# Patient Record
Sex: FEMALE | Race: BLACK OR AFRICAN AMERICAN | NOT HISPANIC OR LATINO | Employment: FULL TIME | URBAN - METROPOLITAN AREA
[De-identification: names, ages, dates, MRNs, and addresses within clinical notes are randomized per-mention and may not be internally consistent; named-entity substitution may affect disease eponyms.]

---

## 2023-10-28 PROBLEM — R92.30 DENSE BREAST TISSUE: Status: ACTIVE | Noted: 2023-10-28

## 2023-10-28 PROBLEM — J01.90 ACUTE SINUSITIS: Status: RESOLVED | Noted: 2023-10-28 | Resolved: 2023-10-28

## 2023-10-28 PROBLEM — N89.8 VAGINAL DRYNESS: Status: RESOLVED | Noted: 2023-10-28 | Resolved: 2023-10-28

## 2023-10-28 PROBLEM — E16.2 HYPOGLYCEMIA: Status: ACTIVE | Noted: 2023-10-28

## 2023-10-28 PROBLEM — B35.1 ONYCHOMYCOSIS: Status: ACTIVE | Noted: 2023-10-28

## 2023-10-28 PROBLEM — H65.93 BILATERAL SEROUS OTITIS MEDIA: Status: RESOLVED | Noted: 2023-10-28 | Resolved: 2023-10-28

## 2023-10-28 PROBLEM — S99.929A TOE INJURY: Status: RESOLVED | Noted: 2023-10-28 | Resolved: 2023-10-28

## 2023-10-28 PROBLEM — M79.672 FOOT ARCH PAIN, LEFT: Status: RESOLVED | Noted: 2023-10-28 | Resolved: 2023-10-28

## 2023-10-28 PROBLEM — I10 HYPERTENSION: Status: ACTIVE | Noted: 2023-10-28

## 2023-10-28 PROBLEM — M25.511 RIGHT SHOULDER PAIN: Status: RESOLVED | Noted: 2023-10-28 | Resolved: 2023-10-28

## 2023-10-28 PROBLEM — R55 SYNCOPE AND COLLAPSE: Status: RESOLVED | Noted: 2023-10-28 | Resolved: 2023-10-28

## 2023-10-28 PROBLEM — R82.90 ABNORMAL FINDING IN URINE: Status: RESOLVED | Noted: 2023-10-28 | Resolved: 2023-10-28

## 2023-10-28 PROBLEM — J06.9 UPPER RESPIRATORY INFECTION: Status: RESOLVED | Noted: 2023-10-28 | Resolved: 2023-10-28

## 2023-10-28 PROBLEM — E55.9 VITAMIN D DEFICIENCY: Status: ACTIVE | Noted: 2023-10-28

## 2023-10-28 PROBLEM — J30.9 ALLERGIC RHINITIS: Status: ACTIVE | Noted: 2023-10-28

## 2023-10-28 PROBLEM — E11.9 DIABETES MELLITUS (MULTI): Status: ACTIVE | Noted: 2023-10-28

## 2023-10-28 PROBLEM — M75.81 ROTATOR CUFF TENDINITIS, RIGHT: Status: ACTIVE | Noted: 2023-10-28

## 2023-10-28 PROBLEM — B37.31 YEAST VAGINITIS: Status: RESOLVED | Noted: 2023-10-28 | Resolved: 2023-10-28

## 2023-10-28 PROBLEM — K58.9 IRRITABLE BOWEL SYNDROME: Status: ACTIVE | Noted: 2023-10-28

## 2023-10-28 PROBLEM — B02.9 SHINGLES: Status: RESOLVED | Noted: 2023-10-28 | Resolved: 2023-10-28

## 2023-10-28 PROBLEM — N76.0 VULVOVAGINITIS, ESTROGEN DEFICIENT: Status: ACTIVE | Noted: 2023-10-28

## 2023-10-28 PROBLEM — R92.8 ABNORMAL SCREENING MAMMOGRAM: Status: RESOLVED | Noted: 2023-10-28 | Resolved: 2023-10-28

## 2023-10-28 PROBLEM — M54.16 LUMBAR RADICULOPATHY: Status: ACTIVE | Noted: 2023-10-28

## 2023-10-28 PROBLEM — E78.5 DYSLIPIDEMIA: Status: ACTIVE | Noted: 2023-10-28

## 2023-10-28 PROBLEM — E11.9 DIABETES MELLITUS (MULTI): Status: RESOLVED | Noted: 2023-10-28 | Resolved: 2023-10-28

## 2023-10-28 PROBLEM — R92.1 BREAST CALCIFICATIONS ON MAMMOGRAM: Status: ACTIVE | Noted: 2023-10-28

## 2023-10-28 RX ORDER — NAPROXEN 500 MG/1
1 TABLET ORAL EVERY 12 HOURS PRN
COMMUNITY
Start: 2021-11-15

## 2023-10-28 RX ORDER — INSULIN LISPRO 100 [IU]/ML
INJECTION, SOLUTION INTRAVENOUS; SUBCUTANEOUS
COMMUNITY
Start: 2013-01-07

## 2023-10-28 RX ORDER — ROSUVASTATIN CALCIUM 20 MG/1
20 TABLET, COATED ORAL
COMMUNITY
Start: 2023-09-12 | End: 2024-04-26 | Stop reason: SDUPTHER

## 2023-10-28 RX ORDER — LISINOPRIL 20 MG/1
20 TABLET ORAL
COMMUNITY
Start: 2023-09-12

## 2023-10-28 RX ORDER — VALACYCLOVIR HYDROCHLORIDE 1 G/1
1 TABLET, FILM COATED ORAL 3 TIMES DAILY
COMMUNITY
Start: 2021-03-05

## 2023-10-28 RX ORDER — TIZANIDINE 2 MG/1
1 TABLET ORAL 2 TIMES DAILY PRN
COMMUNITY
Start: 2021-11-15

## 2023-11-20 ENCOUNTER — OFFICE VISIT (OUTPATIENT)
Dept: PRIMARY CARE | Facility: CLINIC | Age: 68
End: 2023-11-20
Payer: COMMERCIAL

## 2023-11-20 DIAGNOSIS — Z00.00 ROUTINE GENERAL MEDICAL EXAMINATION AT A HEALTH CARE FACILITY: Primary | ICD-10-CM

## 2023-11-20 LAB
ALBUMIN SERPL BCP-MCNC: 4.4 G/DL (ref 3.4–5)
ALP SERPL-CCNC: 101 U/L (ref 33–136)
ALT SERPL W P-5'-P-CCNC: 18 U/L (ref 7–45)
ANION GAP SERPL CALC-SCNC: 15 MMOL/L (ref 10–20)
AST SERPL W P-5'-P-CCNC: 18 U/L (ref 9–39)
BILIRUB SERPL-MCNC: 0.7 MG/DL (ref 0–1.2)
BUN SERPL-MCNC: 15 MG/DL (ref 6–23)
CALCIUM SERPL-MCNC: 10 MG/DL (ref 8.6–10.6)
CHLORIDE SERPL-SCNC: 107 MMOL/L (ref 98–107)
CO2 SERPL-SCNC: 25 MMOL/L (ref 21–32)
CREAT SERPL-MCNC: 0.84 MG/DL (ref 0.5–1.05)
ERYTHROCYTE [DISTWIDTH] IN BLOOD BY AUTOMATED COUNT: 13.8 % (ref 11.5–14.5)
GFR SERPL CREATININE-BSD FRML MDRD: 77 ML/MIN/1.73M*2
GLUCOSE SERPL-MCNC: 157 MG/DL (ref 74–99)
HCT VFR BLD AUTO: 41.8 % (ref 36–46)
HGB BLD-MCNC: 12.2 G/DL (ref 12–16)
MCH RBC QN AUTO: 29.8 PG (ref 26–34)
MCHC RBC AUTO-ENTMCNC: 29.2 G/DL (ref 32–36)
MCV RBC AUTO: 102 FL (ref 80–100)
NRBC BLD-RTO: 0 /100 WBCS (ref 0–0)
PLATELET # BLD AUTO: 341 X10*3/UL (ref 150–450)
POTASSIUM SERPL-SCNC: 4.4 MMOL/L (ref 3.5–5.3)
PROT SERPL-MCNC: 7.3 G/DL (ref 6.4–8.2)
RBC # BLD AUTO: 4.1 X10*6/UL (ref 4–5.2)
SODIUM SERPL-SCNC: 143 MMOL/L (ref 136–145)
TSH SERPL-ACNC: 2.06 MIU/L (ref 0.44–3.98)
VIT B12 SERPL-MCNC: 445 PG/ML (ref 211–911)
WBC # BLD AUTO: 5.8 X10*3/UL (ref 4.4–11.3)

## 2023-11-20 PROCEDURE — 3079F DIAST BP 80-89 MM HG: CPT | Performed by: FAMILY MEDICINE

## 2023-11-20 PROCEDURE — 4010F ACE/ARB THERAPY RXD/TAKEN: CPT | Performed by: FAMILY MEDICINE

## 2023-11-20 PROCEDURE — 1126F AMNT PAIN NOTED NONE PRSNT: CPT | Performed by: FAMILY MEDICINE

## 2023-11-20 PROCEDURE — 1036F TOBACCO NON-USER: CPT | Performed by: FAMILY MEDICINE

## 2023-11-20 PROCEDURE — 3075F SYST BP GE 130 - 139MM HG: CPT | Performed by: FAMILY MEDICINE

## 2023-11-20 PROCEDURE — 82607 VITAMIN B-12: CPT

## 2023-11-20 PROCEDURE — 85027 COMPLETE CBC AUTOMATED: CPT

## 2023-11-20 PROCEDURE — 36415 COLL VENOUS BLD VENIPUNCTURE: CPT

## 2023-11-20 PROCEDURE — 99397 PER PM REEVAL EST PAT 65+ YR: CPT | Performed by: FAMILY MEDICINE

## 2023-11-20 PROCEDURE — 1159F MED LIST DOCD IN RCRD: CPT | Performed by: FAMILY MEDICINE

## 2023-11-20 PROCEDURE — 84443 ASSAY THYROID STIM HORMONE: CPT

## 2023-11-20 PROCEDURE — 80053 COMPREHEN METABOLIC PANEL: CPT

## 2023-11-20 RX ORDER — PNEUMOCOCCAL 20-VALENT CONJUGATE VACCINE 2.2; 2.2; 2.2; 2.2; 2.2; 2.2; 2.2; 2.2; 2.2; 2.2; 2.2; 2.2; 2.2; 2.2; 2.2; 2.2; 4.4; 2.2; 2.2; 2.2 UG/.5ML; UG/.5ML; UG/.5ML; UG/.5ML; UG/.5ML; UG/.5ML; UG/.5ML; UG/.5ML; UG/.5ML; UG/.5ML; UG/.5ML; UG/.5ML; UG/.5ML; UG/.5ML; UG/.5ML; UG/.5ML; UG/.5ML; UG/.5ML; UG/.5ML; UG/.5ML
0.5 INJECTION, SUSPENSION INTRAMUSCULAR ONCE
Qty: 0.5 ML | Refills: 0 | Status: SHIPPED | OUTPATIENT
Start: 2023-11-20 | End: 2023-11-20

## 2023-11-20 ASSESSMENT — ENCOUNTER SYMPTOMS
OCCASIONAL FEELINGS OF UNSTEADINESS: 0
LOSS OF SENSATION IN FEET: 0
DEPRESSION: 0

## 2023-11-20 ASSESSMENT — PATIENT HEALTH QUESTIONNAIRE - PHQ9
SUM OF ALL RESPONSES TO PHQ9 QUESTIONS 1 AND 2: 0
1. LITTLE INTEREST OR PLEASURE IN DOING THINGS: NOT AT ALL
2. FEELING DOWN, DEPRESSED OR HOPELESS: NOT AT ALL

## 2023-11-20 NOTE — PROGRESS NOTES
"  Subjective     Patient ID: Tracey Price is a 67 y.o. female who presents for Annual Exam.      Last Physical :  1 Years ago     Pt's PMH, PSH, SH, FH , meds and allergies was obtained / reviewed and updated .     Dental visits : Y  Vision issues : N  Hearing issues : N    Immunizations : Y    Diet :  could be better  Exercise:  Weight concerns :     Alcohol: as noted in SH  Tobacco: as noted in SH  Recreational drug use : None/ as noted in SH    Sexually active : Active   Contraception :   Menstrual problems:  Premenopausal/perimenopausal/ postmenopausal:    G:  Parity:  Full term:    Premature:   (s):   Living :  Ab induced:   Ab spontaneous :  Ectopic :   Multiple :    PAP smear :  Mammogram :  Colonoscopy:    Metabolic screening   - Lipid   - Glucose  ======================================================    Visit Vitals  Blood Pressure 138/88   Height 1.702 m (5' 7\")   Weight 82.1 kg (181 lb)   Body Mass Index 28.35 kg/m²   Smoking Status Never   Body Surface Area 1.97 m²      No LMP recorded.     =====================================    Review of systems:  Constitutional: no chills, no fever and no night sweats.     Eyes: no blurred vision and no eyesight problems.     ENT: no hearing loss, no nasal congestion, no nasal discharge, no hoarseness and no sore throat.     Cardiovascular: no chest pain, no intermittent leg claudication, no lower extremity edema, no palpitations and no syncope.     Respiratory: no cough, no shortness of breath during exertion, no shortness of breath at rest and no wheezing.     Gastrointestinal: no abdominal pain, no blood in stools, no constipation, no diarrhea, no melena, no nausea, no rectal pain and no vomiting.     Genitourinary: no dysuria, no change in urinary frequency, no urinary hesitancy, no feelings of urinary urgency and no vaginal discharge.     Musculoskeletal: no arthralgias, no back pain and no myalgias.     Integumentary: no new skin lesions and no " rashes.     Neurological: no difficulty walking, no headache, no limb weakness, has had numbness tingling    Psychiatric: no anxiety, no depression, no anhedonia and no substance use disorders.     Endocrine: no recent weight gain and no recent weight loss.     Hematologic/Lymphatic: no tendency for easy bruising and no swollen glands.   ============================================================    Physical exam :    Constitutional: Alert and in no acute distress. Well developed, well nourished.     Eyes: Normal external exam. Pupils were equal in size, round, reactive to light (PERRL) with normal accommodation and extraocular movements intact (EOMI).     Ears, Nose, Mouth, and Throat: External inspection of ears and nose: Normal.  Otoscopic examination: Normal.      Neck: No neck mass was observed. Supple.     Cardiovascular: Heart rate and rhythm were normal, normal S1 and S2, no gallops, no murmurs and no pericardial rub    Pulmonary: No respiratory distress. Clear bilateral breath sounds.     Abdomen: Soft nontender; no abdominal mass palpated. No organomegaly.     Musculoskeletal: No joint swelling seen, normal movements of all extremities. Range of motion: Normal.  Muscle strength/tone: Normal.      Skin: Normal skin color and pigmentation, normal skin turgor, and no rash.     Neurologic: Nonfocal.     Psychiatric: Judgment and insight: Intact. Mood and affect: Normal.    Lymphatic : Cervical/ axillary/ groin Lns Palpable/ non palpable            All other systems have been reviewed and are negative for complaint.      Assessment/Plan    Problem List Items Addressed This Visit             ICD-10-CM    Routine general medical examination at a health care facility - Primary Z00.00    Relevant Orders    CBC (Completed)    Comprehensive Metabolic Panel (Completed)    TSH with reflex to Free T4 if abnormal (Completed)    Vitamin B12 (Completed)

## 2023-11-25 VITALS
WEIGHT: 181 LBS | SYSTOLIC BLOOD PRESSURE: 138 MMHG | HEIGHT: 67 IN | BODY MASS INDEX: 28.41 KG/M2 | DIASTOLIC BLOOD PRESSURE: 88 MMHG

## 2023-11-25 PROBLEM — Z00.00 ROUTINE GENERAL MEDICAL EXAMINATION AT A HEALTH CARE FACILITY: Status: ACTIVE | Noted: 2023-11-25

## 2024-04-26 DIAGNOSIS — E78.5 DYSLIPIDEMIA: Primary | ICD-10-CM

## 2024-04-26 RX ORDER — ROSUVASTATIN CALCIUM 20 MG/1
20 TABLET, COATED ORAL
Qty: 90 TABLET | Refills: 1 | Status: SHIPPED | OUTPATIENT
Start: 2024-04-26

## 2024-04-26 NOTE — TELEPHONE ENCOUNTER
Last OV 11/23    Requested Prescriptions     Pending Prescriptions Disp Refills    rosuvastatin (Crestor) 20 mg tablet 90 tablet 1     Sig: Take 1 tablet (20 mg) by mouth once daily.

## 2024-05-23 ENCOUNTER — OFFICE VISIT (OUTPATIENT)
Dept: OBSTETRICS AND GYNECOLOGY | Facility: CLINIC | Age: 69
End: 2024-05-23
Payer: COMMERCIAL

## 2024-05-23 VITALS
WEIGHT: 177 LBS | DIASTOLIC BLOOD PRESSURE: 70 MMHG | BODY MASS INDEX: 28.45 KG/M2 | HEIGHT: 66 IN | SYSTOLIC BLOOD PRESSURE: 130 MMHG

## 2024-05-23 DIAGNOSIS — Z80.3 FAMILY HISTORY OF BREAST CANCER IN FIRST DEGREE RELATIVE: ICD-10-CM

## 2024-05-23 DIAGNOSIS — Z01.419 ENCOUNTER FOR GYNECOLOGICAL EXAMINATION WITHOUT ABNORMAL FINDING: Primary | ICD-10-CM

## 2024-05-23 DIAGNOSIS — E55.9 VITAMIN D DEFICIENCY: ICD-10-CM

## 2024-05-23 DIAGNOSIS — Z78.0 MENOPAUSE: ICD-10-CM

## 2024-05-23 DIAGNOSIS — N95.8 GENITOURINARY SYNDROME OF MENOPAUSE: ICD-10-CM

## 2024-05-23 PROCEDURE — 99397 PER PM REEVAL EST PAT 65+ YR: CPT | Performed by: OBSTETRICS & GYNECOLOGY

## 2024-05-23 PROCEDURE — 3078F DIAST BP <80 MM HG: CPT | Performed by: OBSTETRICS & GYNECOLOGY

## 2024-05-23 PROCEDURE — 3075F SYST BP GE 130 - 139MM HG: CPT | Performed by: OBSTETRICS & GYNECOLOGY

## 2024-05-23 PROCEDURE — 4010F ACE/ARB THERAPY RXD/TAKEN: CPT | Performed by: OBSTETRICS & GYNECOLOGY

## 2024-05-23 PROCEDURE — 1159F MED LIST DOCD IN RCRD: CPT | Performed by: OBSTETRICS & GYNECOLOGY

## 2024-05-23 RX ORDER — ESTRADIOL 0.1 MG/G
0.5 CREAM VAGINAL NIGHTLY
Qty: 42.5 G | Refills: 3 | Status: SHIPPED | OUTPATIENT
Start: 2024-05-23 | End: 2025-05-23

## 2024-05-23 NOTE — PROGRESS NOTES
Subjective   Tracey Price is a 68 y.o. female here for a routine exam. She has no postmenopausal bleeding or discharge.  No change in bowel habits or dysuria.  She is current on her colonoscopy.    We discussed that she plans to retire in 2024.    She does have a history of breast cancer in her mother and maternal grandmother. Personal health questionnaire reviewed: yes.     Gynecologic History  No LMP recorded. Patient is postmenopausal.  Contraception: post menopausal status  Last Pap: 23. Results were: normal  Last mammogram: 23. Results were: normal    Obstetric History  OB History    Para Term  AB Living   0 0 0 0 0 0   SAB IAB Ectopic Multiple Live Births   0 0 0 0 0       Objective   Constitutional: Alert and in no acute distress. Well developed, well nourished.   Head and Face: Head and face: Normal.    Eyes: Normal external exam - nonicteric sclera, extraocular movements intact (EOMI) and no ptosis.   Neck: No neck asymmetry. Supple. Thyroid not enlarged and there were no palpable thyroid nodules.    Pulmonary: No respiratory distress.   Chest: Breasts: Normal appearance, no nipple discharge and no skin changes. Palpation of breasts and axillae: No palpable mass and no axillary lymphadenopathy.   Abdomen: Soft nontender; no abdominal mass palpated. No organomegaly. No hernias.   Genitourinary: External genitalia: Normal. No inguinal lymphadenopathy. Bartholin's Urethral and Skenes Glands: Normal. Urethra: Normal.  Bladder: Normal on palpation. Vagina: Normal. Cervix: Normal.  Uterus: Normal.  Right Adnexa/parametria: Normal.  Left Adnexa/parametria: Normal.  Inspection of Perianal Area: Normal.   Musculoskeletal: No joint swelling seen, normal movements of all extremities.   Skin: Normal skin color and pigmentation, normal skin turgor, and no rash.   Neurologic: Non-focal. Grossly intact.   Psychiatric: Alert and oriented x 3. Affect normal to patient baseline. Mood:  Appropriate.  Physical Exam     Assessment/Plan   Healthy female exam.  This is a 68-year-old female with a normal exam.  No Pap was sent, she is high risk HPV negative.    Her routine mammogram was ordered with tomosynthesis.    We discussed obtaining a bone density test to monitor for osteoporosis.    We did discuss estradiol cream refill to manage the vaginal dryness of menopause.    She has family history of breast cancer and a genetic consultation was placed.    I will see her routinely in 1 year.  Mammogram ordered.

## 2024-07-01 ENCOUNTER — HOSPITAL ENCOUNTER (OUTPATIENT)
Dept: RADIOLOGY | Facility: CLINIC | Age: 69
Discharge: HOME | End: 2024-07-01
Payer: MEDICARE

## 2024-07-01 VITALS — WEIGHT: 174 LBS | BODY MASS INDEX: 28.08 KG/M2

## 2024-07-01 DIAGNOSIS — Z01.419 ENCOUNTER FOR GYNECOLOGICAL EXAMINATION WITHOUT ABNORMAL FINDING: ICD-10-CM

## 2024-07-01 PROCEDURE — 77063 BREAST TOMOSYNTHESIS BI: CPT | Performed by: RADIOLOGY

## 2024-07-01 PROCEDURE — 77067 SCR MAMMO BI INCL CAD: CPT

## 2024-07-01 PROCEDURE — 77067 SCR MAMMO BI INCL CAD: CPT | Performed by: RADIOLOGY

## 2024-09-18 DIAGNOSIS — E78.5 DYSLIPIDEMIA: ICD-10-CM

## 2024-09-18 RX ORDER — ROSUVASTATIN CALCIUM 20 MG/1
20 TABLET, COATED ORAL DAILY
Qty: 90 TABLET | Refills: 1 | Status: SHIPPED | OUTPATIENT
Start: 2024-09-18

## 2024-09-28 NOTE — PROGRESS NOTES
History of Present Illness:  Tracey Price is a 68 y.o. female with a family of breast cancer.  Tracey Price was referred to the Cancer Genetics Clinic at [facility] by Tanya Noble. Tracey Price is interested in genetic testing to clarify her personal risk for cancer, as well as the risks to her family members.    Cancer Medical History:  Personal history of cancer? No    Prior hereditary cancer (germline) genetic testing? No    Cancer screening history:  Mammograms? Yes, most recent 24. Negative. Right breast biopsy on 17 showed calcified fibroadenoma.  PAP smear? Negative pap smear 23.  Colonoscopy? Negative cologuard in 23. Has not done a colonoscopy yet.  Upper endoscopy? Yes, patient reports this was done many years ago (in the s) due to acid reflux. No records available.  Dermatology? No   Other cancer screening? No    Reproductive History:  Number of children: 0  Number of pregnancies: 0  Age first birth: N/A  Breast feeding?  N/A  Menarche (age): 17  Menopause (age): 50  OCP: Yes, for ~5 years total  HRT: Yes - When first diagnosed with menopause, used for 10 years.    Hysterectomy? No  Oophorectomy? No    Family history:  A 4-generation pedigree was obtained and was significant for the following:     - Mother (85) who was diagnosed with unilateral breast cancer in her late 60's.  - Maternal uncle  in his 70's due to an unknown cancer  - Maternal grandmother with breast cancer diagnosed in her 40's. In her 50's, she was diagnosed with breast cancer in the other breast. She  in her 70's due to Alzheimer's disease.    - Father diagnosed with lymphoma in his 60's. No history of other immune or medical concerns prior to developing the cancer. Smoking history. He  at 69 due to Parkinson's disease. The cancer was reportedly gone by the time of his death.    - Paternal aunt  diagnosed with stomach cancer in her late 60's. She  in her mid 70's due to  the cancer. Her son (patient's paternal cousin)  in his 40's/50's due to an unknown cancer.  - Paternal uncle  in his 30's due to throat cancer. Smoking history.  - Paternal cousin diagnosed with stomach cancer in her early 50's, and she  2 years after her diagnosis due to the cancer.    Maternal ancestry is .  Paternal ancestry is . There is no known Ashkenazi [employer]od[employer] ancestry. Consanguinity was denied.     Genetic counseling:    Summary  Ms. Price is a 68 y.o. female with a family history of breast cancer.     Tracey Price's reported history is concerning for possible hereditary breast cancer. Ms. Price meets current national (NCCN) criteria for testing of high-penetrance breast cancer susceptibility genes, including BRCA1, BRCA2, CDH1, PALB2, PTEN, STK11, and TP53.      Testing is medically necessary, as it will help determine if Ms. Price is a candidate for high-risk breast care, including more frequent screenings and consideration of a risk reducing bilateral mastectomy    Tracey is interested in testing, however testing was not ordered today. We discussed the limitations in testing an unaffected individual. Tracey will talk with her mother, who has a history of breast cancer, to see if she is interested in testing first. Tracey will call me back and we will discuss if she would like to proceed with testing.    Discussion    We reviewed genes and chromosomes, inherited forms of breast cancer.  We discussed that most cancers are not due to an inherited genetic susceptibility.  However, in about 5-10% of families, there is an inherited genetic mutation that can make a person more susceptible to developing certain forms of cancer.  Within families with a genetic predisposition to cancer, we often see certain patterns, such as multiple family members with cancer and cancers occurring in multiple generations. In addition, earlier onset and/or bilateral cancers are  suggestive of an inherited predisposition. There also tends to be a clustering of certain types of related cancer in these families, such as breast and ovarian cancers, or colon and uterine cancers.     We discussed the BRCA1 and BRCA2 genes, which are two genes that have been linked to early-onset breast and/or ovarian cancer, causing HBOC (hereditary breast and ovarian cancer syndrome).  Mutations in these genes are inherited in a dominant pattern and confer up to an 87% lifetime risk for breast cancer.  This is elevated compared to the general population risk of 10-12%.  In addition, BRCA1 and BRCA2 mutation carriers have up to a 45% lifetime risk for ovarian cancer, which is elevated over the 2% general population risk.  Mutation carriers who have already been diagnosed with cancer have an increased risk to develop a second, contralateral breast cancer.  BRCA2 gene mutation carriers have an increased risk for male breast cancer, prostate cancer, melanoma, gastric cancer, and pancreatic cancer.    Gene mutations in BRCA1 and BRCA2 are inherited in an autosomal dominant fashion. This means that if an individual has a change in either of these genes, their siblings and children have a 50% chance of also having that gene change and a 50% chance of not having the gene change.     We discussed that there are multiple genes associated with increased breast cancer risk.  Some genes, like the BRCA1 and BRCA2 genes, are considered highly penetrant breast cancer genes, meaning a mutation in the gene confers a high risk of breast cancer.  Additionally, there are other intermediate (moderate risk) breast cancer genes. For some of the moderate risk genes, there is often limited information regarding the degree to which a mutation in the gene impacts ones risk for different types of cancers.  Additionally, for some of these moderate risk genes, the appropriate management for individuals who have a mutation in one of these  genes is not always clear.  Our knowledge about the cancer risks associated with mutations in these moderate risk genes is always growing, and we will likely be able to provide more comprehensive information in the future. In many cases, even if an individual tests positive for a mutation in a moderate risk gene, recommendations are still based on the family history, not the positive test result. Additionally, unexpected findings may occur, where a mutation is identified that confers a risk for a cancer not seen in the family history.  Lastly, in the era of multigene panel testing, we know that more than one pathogenic or likely pathogenic variant can be identified in any one individual and/or family    We also discussed her paternal family history of cancer. Ms. Price reported a paternal aunt with stomach cancer in her late 60's, and a paternal cousin with stomach cancer diagnosed in her early 50's.  Ms. Price did not know the specific type of stomach cancer. We discussed that sometimes stomach cancer can be a part of a hereditary cancer syndrome called Hereditary Diffuse Gastric Cancer, caused by mutations in the CDH1 gene.  We discussed that Ms. Price does not meet NCCN criteria for genetic testing for this syndrome based on her paternal family history. She was encouraged to update us if she learns additional information about her paternal history, as this may change our risk assessment. If she chooses to pursue testing in the future, however, testing can include this gene.     We reviewed the types of results we can get back:   - A positive result means that we identified a change in a gene that confers an increased cancer risk for cancer. We will discuss potential changes in management for her and her family based on the specific gene mutation found.    - A negative (normal) result clears her for a majority of cancer predisposition syndromes that we are aware of, but cannot clear her for any/all cancer  predisposition risks. She would continue to be screened based on her personal and family history.    - A Variant of Uncertain Significance (VUS) means that some kind of change was found in a gene, but it is currently unknown whether this specific change is harmful.  These results do not  recommendations or warrant familial variant testing.    We reviewed that it is best, if possible, to test a cancer-affected individual when trying to determine the cause of the cancer in the family.  We discussed that if this is not feasible, for any reason, then it is very appropriate to test cancer-unaffected family members.  We discussed that in Ms. Price's family, the most informative relative for testing would be her mother, who has a history of breast cancer.  If testing for her mother is not feasible at this time, we discussed that testing for Ms. Price would certainly be appropriate.    We reviewed the limitations of testing an unaffected individual for a hereditary cancer syndrome. When testing an individual who has not had a cancer diagnosis, like Ms. Price, a negative test result may have limited utility in defining future cancer risks, as it is unclear whether the affected family members had a mutation that the unaffected individual did not inherit, or alternatively, whether the family history of cancer is due to unidentifiable risk factors that are shared between the unaffected individual and their family members. Ms. Price will talk with her mother, who has a history of breast cancer, to see if she is interested in testing first. Ms. Price will call me back and we will discuss if she would like to proceed with testing. Ms. Price gave me verbal permission to look in her chart if her mother calls me to discuss testing.    We discussed that if her mother proceeds with testing and is negative, then testing for Ms. Price would not be indicated based on her maternal family history of cancer. If her  mother were to test positive, Ms. Price and her brother would be at a 50% chance to inherit the gene mutation. Of note, she does not currently meet NCCN guidelines for genetic testing based on her paternal history of cancer.    Although Ms. Price meets NCCN criteria for genetic testing, she does not meet Medicare testing criteria. Ms. Price reported that Medicare is her primary insurance. Medicare only covers genetic testing for individuals who are affected with certain cancer types.  Ms. Price was offered testing via the self-pay option of $249. Ultimately, Ms. Price decided she wanted to talk with her mother first before deciding whether or not to proceed with testing for herself.    Since testing was not ordered today, we performed a [facility] risk assessment for Ms. Price to estimate her lifetime risk of developing breast cancer. Results showed that her lifetime risk for breast cancer is 29.3%. We discussed that this risk assessment may change if Tracey or her mother decide to proceed with testing. For women with a lifetime risk of breast cancer estimated to be >20%, The National Comprehensive Cancer Network (NCCN) guidelines include:   Breast awareness,   Clinical breast examination performed by a physician every 6-12 month,  Annual mammogram to begin 10 years prior to the youngest breast cancer diagnosis in the family, but not less than age 30, or begin at age 40, and  Annual breast MRI beginning 10 years prior to the youngest breast cancer diagnosis in the family, but not less than age 25, or begin at 40.    Recommendations and options for management should be discussed with managing physicians.  A referral was placed for Tracey to be seen in the high risk breast cancer clinic given her elevated lifetime risk to develop breast cancer.    Tracey Price was counseled about hereditary cancer susceptibility including cancer risks, options for increased screening and/or risk reduction, genetic testing,  and the implications for other family members.  After a discussion about the risks, benefits, and limitations of genetic testing, Tracey Price chose to not proceed with testing today. Ms. Price will ask her mother if she would be open to pursuing genetic testing first, and will follow up with me to discuss next steps.      PLAN:  Tracey will talk with her mother, who has a history of breast cancer, to see if she is interested in testing first. Tracey will follow up with me to discuss if she would like to proceed with testing.  A referral was placed for Tracey to be seen in the high risk breast cancer clinic given her elevated lifetime risk to develop breast cancer.  We remain available to Ms. Price at [phone] if any questions arise regarding information discussed at today's visit.    Georgina Jewell MS, GC  Licensed Genetic Counselor  [facility]  Phone: [phone]     Reviewed by:  Edna Mendez MD  Clinical   [facility]    Time spent with patient: 65 minutes  An virtual (video and audio) between the patient (at the originating site) and provider (at the distant site) was utilized to provide this telehealth service. Verbal consent was requested and obtained from Tracey Price on this date, October 2, 2024 for a telehealth visit.  The patient stated they were located in [place] at the time of visit.

## 2024-10-02 ENCOUNTER — APPOINTMENT (OUTPATIENT)
Dept: GENETICS | Facility: CLINIC | Age: 69
End: 2024-10-02
Payer: COMMERCIAL

## 2024-10-02 DIAGNOSIS — Z80.7 FAMILY HISTORY OF LYMPHOMA: ICD-10-CM

## 2024-10-02 DIAGNOSIS — Z80.0 FAMILY HISTORY OF STOMACH CANCER: ICD-10-CM

## 2024-10-02 DIAGNOSIS — Z80.3 FAMILY HISTORY OF BREAST CANCER IN FIRST DEGREE RELATIVE: ICD-10-CM

## 2024-10-02 DIAGNOSIS — Z71.83 ENCOUNTER FOR NONPROCREATIVE GENETIC COUNSELING: Primary | ICD-10-CM

## 2024-10-02 DIAGNOSIS — Z80.0 FAMILY HISTORY OF THROAT CANCER: ICD-10-CM

## 2024-10-02 PROCEDURE — GENMD PR GENETICS VISIT (MEDICAID/MEDICARE)

## 2024-10-04 ENCOUNTER — TELEPHONE (OUTPATIENT)
Dept: GENETICS | Facility: CLINIC | Age: 69
End: 2024-10-04
Payer: COMMERCIAL

## 2024-10-04 NOTE — TELEPHONE ENCOUNTER
I spoke with Ms. Price on 10/4/24 regarding genetic testing. Her mother is open to pursuing genetic testing. Ms. Price was given the phone number for our front office to have her mother scheduled for a genetic counseling session.    We discussed the importance of testing a cancer-affected individual first, as it may impact Ms. Price risk assessment and need for increased screening.     Since Ms. Price has a estimated lifetime risk of breast cancer that is >20%, she was referred to the high risk breast clinic. She is scheduled to see them on November 19th.   We discussed that her risk assessment may change based off of her moms genetic test results.

## 2024-10-17 ENCOUNTER — TELEPHONE (OUTPATIENT)
Dept: GENETICS | Facility: CLINIC | Age: 69
End: 2024-10-17
Payer: COMMERCIAL

## 2024-10-17 NOTE — TELEPHONE ENCOUNTER
Called patient to confirm that her mother was scheduled for a genetics visit. We discussed that an updated risk assessment will be done for Tracey after her mothers visit and potential testing.

## 2024-10-22 DIAGNOSIS — E78.5 DYSLIPIDEMIA: ICD-10-CM

## 2024-10-22 RX ORDER — ROSUVASTATIN CALCIUM 20 MG/1
20 TABLET, COATED ORAL DAILY
Qty: 90 TABLET | Refills: 1 | OUTPATIENT
Start: 2024-10-22

## 2024-10-22 RX ORDER — ROSUVASTATIN CALCIUM 20 MG/1
20 TABLET, COATED ORAL DAILY
Qty: 30 TABLET | Refills: 0 | Status: SHIPPED | OUTPATIENT
Start: 2024-10-22 | End: 2024-10-24

## 2024-10-24 DIAGNOSIS — E78.5 DYSLIPIDEMIA: ICD-10-CM

## 2024-10-24 RX ORDER — ROSUVASTATIN CALCIUM 20 MG/1
20 TABLET, COATED ORAL DAILY
Qty: 90 TABLET | Refills: 0 | Status: SHIPPED | OUTPATIENT
Start: 2024-10-24

## 2024-10-28 DIAGNOSIS — E78.5 DYSLIPIDEMIA: ICD-10-CM

## 2024-10-28 RX ORDER — ROSUVASTATIN CALCIUM 20 MG/1
20 TABLET, COATED ORAL DAILY
Qty: 90 TABLET | Refills: 0 | Status: SHIPPED | OUTPATIENT
Start: 2024-10-28

## 2024-11-17 NOTE — PROGRESS NOTES
Chief Complaint  New patient, high risk evaluation     History Of Present Illness  Tracey Price is a very pleasant 68 y.o. AA  female presenting with for a high risk breast cancer evaluation.  2017 benign right breast core biopsy yielded fibroadenoma. She denies breast  surgery. She has family history breast cancer in mother and maternal grandmother.      BREAST IMAGIN2024 Bilateral screening mammogram, BI-RADS Category 2. No breast MRIs performed.      REPRODUCTIVE HISTORY: menarche age 16, nulliparous, OCP's > 20 years,  natural menopause age 50, heterogeneously dense breast tissue          FAMILY CANCER HISTORY:   Mother: Breast cancer 65  Maternal grandmother: Breast cancer age  45    Review of Systems  Constitutional:  Negative for appetite change, fatigue, fever and unexpected weight change.   HENT:  Negative for ear pain, hearing loss, nosebleeds, sore throat and trouble swallowing.    Eyes:  Negative for discharge, itching and visual disturbance.   Breast: As stated in HPI.  Respiratory:  Negative for cough, chest tightness and shortness of breath.    Cardiovascular:  Negative for chest pain, palpitations and leg swelling.   Gastrointestinal:  Negative for abdominal pain, constipation, diarrhea and nausea.   Endocrine: Negative for cold intolerance and heat intolerance.   Genitourinary:  Negative for dysuria, frequency, hematuria, pelvic pain and vaginal bleeding.   Musculoskeletal:  Negative for arthralgias, back pain, gait problem, joint swelling and myalgias.   Skin:  Negative for color change and rash.   Allergic/Immunologic: Negative for environmental allergies and food allergies.   Neurological:  Negative for dizziness, tremors, speech difficulty, weakness, numbness and headaches.   Hematological:  Does not bruise/bleed easily.   Psychiatric/Behavioral:  Negative for agitation, dysphoric mood and sleep disturbance. The patient is not nervous/anxious.       Surgical History  She has a past  surgical history that includes Breast biopsy (Right).     Social History  She reports that she has never smoked. She has never used smokeless tobacco. She reports that she does not currently use alcohol. She reports that she does not use drugs.    Family History  Family History   Problem Relation Name Age of Onset    Breast cancer Mother      Lymphoma Father      Breast cancer Maternal Grandmother          Allergies  Patient has no known allergies.    Past Medical History  She has a past medical history of Acute sinusitis (10/28/2023), Bilateral serous otitis media (10/28/2023), Foot arch pain, left (10/28/2023), Right shoulder pain (10/28/2023), Shingles (10/28/2023), Syncope and collapse (10/28/2023), Type 2 diabetes mellitus without complications (Multi) (11/18/2022), Vaginal dryness (10/28/2023), and Yeast vaginitis (10/28/2023).     Physical Exam  Patient is alert and oriented x3 and in a relaxed and appropriate mood. Her gait is steady and hand grasps are equal. Sclera is clear. The breasts are nearly symmetrical. The tissue is soft without palpable abnormalities, discrete nodules or masses. The skin and nipples appear normal. There is no cervical, supraclavicular or axillary lymphadenopathy. Heart rate and rhythm normal, S1 and S2 appreciated. The lungs are clear to auscultation bilaterally. Abdomen is soft and non-tender.      Last Recorded Vitals  Blood pressure 148/72, pulse 89, temperature 36.3 °C (97.4 °F), temperature source Temporal, weight 80.3 kg (177 lb), not currently breastfeeding.    Relevant Results and Imaging  BI mammo bilateral screening tomosynthesis 07/01/2024    Narrative  Interpreted By:  Deepali Frost,  STUDY:  BI MAMMO BILATERAL SCREENING TOMOSYNTHESIS;  7/1/2024 7:45 am    ACCESSION NUMBER(S):  [id number]    ORDERING CLINICIAN:  ELIZABETH MEEK    INDICATION:  Screening. Benign right breast biopsy. Family history of breast  cancer.    COMPARISON:  05/23/2023 and  01/13/2022.    FINDINGS:  2D and tomosynthesis images were reviewed at 1 mm slice thickness.    Density:  The breast tissue is heterogeneously dense, which may  obscure small masses.    Bilateral benign calcifications are present. A biopsy tissue marker  is noted in the slightly medial inferior right breast at posterior  depth. No suspicious masses or calcifications are identified.    Impression  No mammographic evidence of malignancy.    BI-RADS CATEGORY:  BI-RADS Category:  2 Benign.  Recommendation:  Annual Screening.  Recommended Date:  1 Year.  Laterality:  Bilateral.        For any future breast imaging appointments, please call [phone] ([phone]).      MACRO:  None    Signed by: Deepali Frost 7/3/2024 5:15 PM  Dictation workstation:   [id number]        Provider Impressions  Normal clinical breast exam and imaging, history benign breast biopsy and no surgery, family history breast cancer. Hesitant to start endocrine therapy- discussed breast MRI surveillance.    Risk Profile      Patient Discussion/Summary  Your clinical examination and imaging are normal. Please return in July for mammogram and office visit or sooner if you have any problems or concerns.     High risk breast surveillance care plan:  Yearly mammogram with digital breast tomosynthesis  Twice yearly clinical breast examinations  Breast MRI-due January   Monthly self breast examinations &/or regular self breast awareness  Vitamin D3 2000 IU/daily (over the counter) unless your PCP recommends you take a specific dose  Exercise 3-4 times per week for 45-60 minutes  Limit alcohol to 3-4 drinks per week if you are menopausal  Eat healthy low-fat diet with lots of vegetable & fruits    Risk model indicates you are eligible for endocrine therapy with Tamoxifen, Raloxifene or Aromatase inhibitor. Endocrine therapy reduces lifetime risk of breast cancer by up to 50% when taken for 5 years. There is an alternative low dose Tamoxifen which can be taken  for only 3 years.      IMPORTANT INFORMATION REGARDING YOUR RESULTS    If you receive medical information from [facility] Personal Health Record (online chart) your results will be released into your chart. This means you may view or see results of your biopsy or procedure before I contact you directly. If this occurs, please call the office and we will discuss your results over the phone.     You can see your health information, review clinical summaries from office visits & test results online when you follow your health with MY  Chart, a personal health record. To sign up go to [email or web address]. If you need assistance with signing up or trouble getting into your account call [employer] Patient Line 24/7 at [phone].    My office phone number is [phone] if you need to get in touch with me or have additional questions or concerns. Thank you for choosing [facility] and trusting me as your healthcare provider. I look forward to seeing you again at your next office visit. I am honored to be a provider on your health care team and I remain dedicated to helping you achieve your health goals.    Crissy Jacques, APRN-CNP

## 2024-11-19 ENCOUNTER — OFFICE VISIT (OUTPATIENT)
Dept: SURGICAL ONCOLOGY | Facility: CLINIC | Age: 69
End: 2024-11-19
Payer: MEDICARE

## 2024-11-19 VITALS
TEMPERATURE: 97.4 F | SYSTOLIC BLOOD PRESSURE: 148 MMHG | BODY MASS INDEX: 28.57 KG/M2 | HEART RATE: 89 BPM | DIASTOLIC BLOOD PRESSURE: 72 MMHG | WEIGHT: 177 LBS

## 2024-11-19 DIAGNOSIS — Z12.39 BREAST CANCER SCREENING, HIGH RISK PATIENT: Primary | ICD-10-CM

## 2024-11-19 DIAGNOSIS — Z80.3 FAMILY HISTORY OF BREAST CANCER IN FIRST DEGREE RELATIVE: ICD-10-CM

## 2024-11-19 DIAGNOSIS — Z12.31 ENCOUNTER FOR SCREENING MAMMOGRAM FOR MALIGNANT NEOPLASM OF BREAST: ICD-10-CM

## 2024-11-19 PROCEDURE — 1160F RVW MEDS BY RX/DR IN RCRD: CPT

## 2024-11-19 PROCEDURE — 1159F MED LIST DOCD IN RCRD: CPT

## 2024-11-19 PROCEDURE — 99204 OFFICE O/P NEW MOD 45 MIN: CPT

## 2024-11-19 PROCEDURE — 3077F SYST BP >= 140 MM HG: CPT

## 2024-11-19 PROCEDURE — 3078F DIAST BP <80 MM HG: CPT

## 2024-11-19 PROCEDURE — 4010F ACE/ARB THERAPY RXD/TAKEN: CPT

## 2024-11-19 PROCEDURE — 1036F TOBACCO NON-USER: CPT

## 2024-11-19 PROCEDURE — 1126F AMNT PAIN NOTED NONE PRSNT: CPT

## 2024-11-19 PROCEDURE — 99214 OFFICE O/P EST MOD 30 MIN: CPT

## 2024-11-19 ASSESSMENT — PAIN SCALES - GENERAL: PAINLEVEL_OUTOF10: 0-NO PAIN

## 2024-11-19 NOTE — PATIENT INSTRUCTIONS
Your clinical examination and imaging are normal. Please return in July for mammogram and office visit or sooner if you have any problems or concerns.     High risk breast surveillance care plan:  Yearly mammogram with digital breast tomosynthesis  Twice yearly clinical breast examinations  Breast MRI-due January   Monthly self breast examinations &/or regular self breast awareness  Vitamin D3 2000 IU/daily (over the counter) unless your PCP recommends you take a specific dose  Exercise 3-4 times per week for 45-60 minutes  Limit alcohol to 3-4 drinks per week if you are menopausal  Eat healthy low-fat diet with lots of vegetable & fruits    Risk model indicates you are eligible for endocrine therapy with Tamoxifen, Raloxifene or Aromatase inhibitor. Endocrine therapy reduces lifetime risk of breast cancer by up to 50% when taken for 5 years. There is an alternative low dose Tamoxifen which can be taken for only 3 years.      IMPORTANT INFORMATION REGARDING YOUR RESULTS    If you receive medical information from My [facility] Personal Health Record (online chart) your results will be released into your chart. This means you may view or see results of your biopsy or procedure before I contact you directly. If this occurs, please call the office and we will discuss your results over the phone.     You can see your health information, review clinical summaries from office visits & test results online when you follow your health with MY  Chart, a personal health record. To sign up go to [email or web address]. If you need assistance with signing up or trouble getting into your account call [employer] Patient Line 24/7 at [phone].    My office phone number is [phone] if you need to get in touch with me or have additional questions or concerns. Thank you for choosing [facility] and trusting me as your healthcare provider. I look forward to seeing you again at your next office visit. I am honored to be  a provider on your health care team and I remain dedicated to helping you achieve your health goals.

## 2024-11-22 ENCOUNTER — APPOINTMENT (OUTPATIENT)
Dept: PRIMARY CARE | Facility: CLINIC | Age: 69
End: 2024-11-22
Payer: COMMERCIAL

## 2024-11-22 DIAGNOSIS — E10.9 TYPE 1 DIABETES MELLITUS WITHOUT COMPLICATION: ICD-10-CM

## 2024-11-22 DIAGNOSIS — E78.5 DYSLIPIDEMIA: ICD-10-CM

## 2024-11-22 DIAGNOSIS — Z00.00 ROUTINE GENERAL MEDICAL EXAMINATION AT A HEALTH CARE FACILITY: Primary | ICD-10-CM

## 2024-11-22 DIAGNOSIS — E55.9 VITAMIN D DEFICIENCY: ICD-10-CM

## 2024-11-22 DIAGNOSIS — R94.120 ABNORMAL AUDITORY FUNCTION STUDY: ICD-10-CM

## 2024-11-22 DIAGNOSIS — Z78.0 POSTMENOPAUSAL: ICD-10-CM

## 2024-11-22 DIAGNOSIS — I10 PRIMARY HYPERTENSION: ICD-10-CM

## 2024-11-22 DIAGNOSIS — Z00.00 ROUTINE GENERAL MEDICAL EXAMINATION AT HEALTH CARE FACILITY: ICD-10-CM

## 2024-11-22 PROCEDURE — 82570 ASSAY OF URINE CREATININE: CPT

## 2024-11-22 PROCEDURE — 82043 UR ALBUMIN QUANTITATIVE: CPT

## 2024-11-22 ASSESSMENT — ACTIVITIES OF DAILY LIVING (ADL)
TAKING_MEDICATION: INDEPENDENT
GROCERY_SHOPPING: INDEPENDENT
DOING_HOUSEWORK: INDEPENDENT
MANAGING_FINANCES: INDEPENDENT
BATHING: INDEPENDENT
DRESSING: INDEPENDENT

## 2024-11-22 ASSESSMENT — ENCOUNTER SYMPTOMS
LOSS OF SENSATION IN FEET: 0
DEPRESSION: 0
OCCASIONAL FEELINGS OF UNSTEADINESS: 0

## 2024-11-22 ASSESSMENT — VISUAL ACUITY
OS_CC: 20/20
OD_CC: 20/15

## 2024-11-22 NOTE — PROGRESS NOTES
"Subjective   Reason for Visit: Tracey Price is an 68 y.o. female here for a Medicare Wellness visit.     Past Medical, Surgical, and Family History reviewed and updated in chart.         HPI  Patient is here for follow-up of diabetes hypertension hyperlipidemia states that she was given ways to increase her lisinopril to 40 mg by her endocrinologist but became extremely dizzy and therefore went back to 20 mg blood pressure is elevated today  Patient Self Assessment of Health Status  Patient Self Assessment: Good    Nutrition and Exercise  Current Diet: Diabetic Diet  Adequate Fluid Intake: Yes  Caffeine: Yes  Exercise Frequency: No Exercise    Functional Ability/Level of Safety  Cognitive Impairment Observed: No cognitive impairment observed  Cognitive Impairment Reported: No cognitive impairment reported by patient or family    Home Safety Risk Factors: None    Patient Care Team:  Anderson Okeefe MD as PCP - General     Review of Systems   Constitutional:  Negative for chills and fever.   HENT: Negative.     Respiratory: Negative.     Cardiovascular: Negative.    Gastrointestinal: Negative.  Negative for nausea and vomiting.   Endocrine: Negative.    Genitourinary: Negative.    Musculoskeletal: Negative.    Skin: Negative.  Negative for rash.   Allergic/Immunologic: Negative.    Neurological: Negative.    Hematological: Negative.    Psychiatric/Behavioral: Negative.     All other systems reviewed and are negative.      Objective   Vitals:  /78   Pulse 70   Ht 1.676 m (5' 6\")   Wt 80.9 kg (178 lb 6.4 oz)   LMP  (LMP Unknown)   BMI 28.79 kg/m²       Physical Exam  Constitutional:       Appearance: Normal appearance.   Cardiovascular:      Rate and Rhythm: Normal rate and regular rhythm.   Pulmonary:      Effort: Pulmonary effort is normal.      Breath sounds: Normal breath sounds.   Abdominal:      General: Bowel sounds are normal.   Neurological:      General: No focal deficit present.      Mental " Status: She is alert.   Psychiatric:         Mood and Affect: Mood normal.         Assessment/Plan   Problem List Items Addressed This Visit       Dyslipidemia    Current Assessment & Plan     Check lipid panel continue medications continue healthy eating work out  150 minutes a week patient will continue low-fat diet increase exercise to 5 times daily         Hypertension    Current Assessment & Plan     What is High Blood Pressure? High blood pressure (also called hypertension) is when your blood moves through your arteries at a higher pressure than normal and that forces your heart to work harder to pump the blood.     What are the Complications of High Blood Pressure? When your blood pressure gets too high or stays high for a long time, it can cause health problems such as:    Kidney disease or kidney failure   Heart attack and heart failure   Stroke   Vision problems   Circulation problems, poor blood supply to the legs    How are the causes of High Blood Pressure? There are many different causes and your provider can help you find out what might be causing your pressure to be too high.     What are the Signs of High Blood Pressure? High blood pressure doesn’t usually have warning signs or symptoms, so many people don’t realize they have it and that is why regular monitoring is important.     Prevention and Treatment: Often high blood pressure can be controlled with lifestyle changes and when necessary, medications. Adopting heart healthy habits can help:      Maintain a Healthy Weight   Eat a heart healthy diet full of vegetables, fruits and whole grains that are high in fiber   Be Physically Active every day   Find heart healthy ways to reduce stress and increase relaxation    Don’t use tobacco products   Limit your intake of alcohol, caffeine and salt   Monitor your blood pressure regularly: ask your provider how often   Take your medications as prescribed, even when you’re feeling well   Patient will  "increase lisinopril to 20 mg twice daily         Relevant Medications    lisinopril 20 mg tablet    Other Relevant Orders    EKG 12-LEAD    Vitamin D deficiency    Relevant Orders    Vitamin B12    Vitamin D 25-Hydroxy,Total (for eval of Vitamin D levels)    DM type 1 (diabetes mellitus, type 1) (Multi)    Current Assessment & Plan     Follow-up with endocrinology [facility] is using NovoLog pump         Relevant Orders    Vitamin B12    Albumin-Creatinine Ratio, Urine Random (Completed)    Routine general medical examination at a health care facility    Relevant Orders    Lipid Panel    CBC (Completed)    TSH with reflex to Free T4 if abnormal    Comprehensive Metabolic Panel    Abnormal auditory function study    Relevant Orders    CBC (Completed)    Postmenopausal - Primary    Relevant Orders    Vitamin B12    Vitamin D 25-Hydroxy,Total (for eval of Vitamin D levels)    XR DEXA bone density      spent 15 minutes obtaining and discussing depression screening using PHQ-2 questions with results documented in chart.”  (If screen positive: \"The screen indicated potential depression and PHQ-9 was obtained with treatment and referral plan discussed         "

## 2024-11-23 ENCOUNTER — LAB (OUTPATIENT)
Dept: LAB | Facility: LAB | Age: 69
End: 2024-11-23
Payer: MEDICARE

## 2024-11-23 VITALS
BODY MASS INDEX: 28.67 KG/M2 | SYSTOLIC BLOOD PRESSURE: 134 MMHG | HEART RATE: 70 BPM | WEIGHT: 178.4 LBS | DIASTOLIC BLOOD PRESSURE: 78 MMHG | HEIGHT: 66 IN

## 2024-11-23 DIAGNOSIS — E10.9 TYPE 1 DIABETES MELLITUS WITHOUT COMPLICATION: ICD-10-CM

## 2024-11-23 DIAGNOSIS — E55.9 VITAMIN D DEFICIENCY: ICD-10-CM

## 2024-11-23 DIAGNOSIS — R94.120 ABNORMAL AUDITORY FUNCTION STUDY: ICD-10-CM

## 2024-11-23 DIAGNOSIS — Z00.00 ROUTINE GENERAL MEDICAL EXAMINATION AT A HEALTH CARE FACILITY: ICD-10-CM

## 2024-11-23 DIAGNOSIS — Z78.0 POSTMENOPAUSAL: ICD-10-CM

## 2024-11-23 LAB
25(OH)D3 SERPL-MCNC: 40 NG/ML (ref 30–100)
ALBUMIN SERPL BCP-MCNC: 4.2 G/DL (ref 3.4–5)
ALP SERPL-CCNC: 84 U/L (ref 33–136)
ALT SERPL W P-5'-P-CCNC: 16 U/L (ref 7–45)
ANION GAP SERPL CALCULATED.3IONS-SCNC: 11 MMOL/L (ref 10–20)
AST SERPL W P-5'-P-CCNC: 17 U/L (ref 9–39)
BILIRUB SERPL-MCNC: 0.9 MG/DL (ref 0–1.2)
BUN SERPL-MCNC: 15 MG/DL (ref 6–23)
CALCIUM SERPL-MCNC: 9.7 MG/DL (ref 8.6–10.3)
CHLORIDE SERPL-SCNC: 108 MMOL/L (ref 98–107)
CHOLEST SERPL-MCNC: 98 MG/DL (ref 0–199)
CHOLEST/HDLC SERPL: 2.5 {RATIO}
CO2 SERPL-SCNC: 27 MMOL/L (ref 21–32)
CREAT SERPL-MCNC: 0.81 MG/DL (ref 0.5–1.05)
CREAT UR-MCNC: 142.1 MG/DL (ref 20–320)
EGFRCR SERPLBLD CKD-EPI 2021: 80 ML/MIN/1.73M*2
ERYTHROCYTE [DISTWIDTH] IN BLOOD BY AUTOMATED COUNT: 13.4 % (ref 11.5–14.5)
GLUCOSE SERPL-MCNC: 110 MG/DL (ref 74–99)
HCT VFR BLD AUTO: 38.9 % (ref 36–46)
HDLC SERPL-MCNC: 39.7 MG/DL
HGB BLD-MCNC: 12.3 G/DL (ref 12–16)
LDLC SERPL CALC-MCNC: 47 MG/DL
MCH RBC QN AUTO: 28.7 PG (ref 26–34)
MCHC RBC AUTO-ENTMCNC: 31.6 G/DL (ref 32–36)
MCV RBC AUTO: 91 FL (ref 80–100)
MICROALBUMIN UR-MCNC: <7 MG/L
MICROALBUMIN/CREAT UR: NORMAL MG/G{CREAT}
NON HDL CHOLESTEROL: 58 MG/DL (ref 0–149)
NRBC BLD-RTO: 0 /100 WBCS (ref 0–0)
PLATELET # BLD AUTO: 308 X10*3/UL (ref 150–450)
POTASSIUM SERPL-SCNC: 4.1 MMOL/L (ref 3.5–5.3)
PROT SERPL-MCNC: 7.3 G/DL (ref 6.4–8.2)
RBC # BLD AUTO: 4.28 X10*6/UL (ref 4–5.2)
SODIUM SERPL-SCNC: 142 MMOL/L (ref 136–145)
TRIGL SERPL-MCNC: 58 MG/DL (ref 0–149)
TSH SERPL-ACNC: 1.86 MIU/L (ref 0.44–3.98)
VIT B12 SERPL-MCNC: 553 PG/ML (ref 211–911)
VLDL: 12 MG/DL (ref 0–40)
WBC # BLD AUTO: 6 X10*3/UL (ref 4.4–11.3)

## 2024-11-23 PROCEDURE — 82607 VITAMIN B-12: CPT

## 2024-11-23 PROCEDURE — 82306 VITAMIN D 25 HYDROXY: CPT

## 2024-11-23 PROCEDURE — 36415 COLL VENOUS BLD VENIPUNCTURE: CPT

## 2024-11-23 RX ORDER — LISINOPRIL 20 MG/1
20 TABLET ORAL 2 TIMES DAILY
Qty: 180 TABLET | Refills: 1 | Status: SHIPPED | OUTPATIENT
Start: 2024-11-23 | End: 2025-11-23

## 2024-11-23 ASSESSMENT — ENCOUNTER SYMPTOMS
PSYCHIATRIC NEGATIVE: 1
FEVER: 0
GASTROINTESTINAL NEGATIVE: 1
NEUROLOGICAL NEGATIVE: 1
CARDIOVASCULAR NEGATIVE: 1
MUSCULOSKELETAL NEGATIVE: 1
VOMITING: 0
ENDOCRINE NEGATIVE: 1
ALLERGIC/IMMUNOLOGIC NEGATIVE: 1
CHILLS: 0
NAUSEA: 0
RESPIRATORY NEGATIVE: 1
HEMATOLOGIC/LYMPHATIC NEGATIVE: 1

## 2024-11-23 NOTE — ASSESSMENT & PLAN NOTE
What is High Blood Pressure? High blood pressure (also called hypertension) is when your blood moves through your arteries at a higher pressure than normal and that forces your heart to work harder to pump the blood.     What are the Complications of High Blood Pressure? When your blood pressure gets too high or stays high for a long time, it can cause health problems such as:    Kidney disease or kidney failure   Heart attack and heart failure   Stroke   Vision problems   Circulation problems, poor blood supply to the legs    How are the causes of High Blood Pressure? There are many different causes and your provider can help you find out what might be causing your pressure to be too high.     What are the Signs of High Blood Pressure? High blood pressure doesn’t usually have warning signs or symptoms, so many people don’t realize they have it and that is why regular monitoring is important.     Prevention and Treatment: Often high blood pressure can be controlled with lifestyle changes and when necessary, medications. Adopting heart healthy habits can help:      Maintain a Healthy Weight   Eat a heart healthy diet full of vegetables, fruits and whole grains that are high in fiber   Be Physically Active every day   Find heart healthy ways to reduce stress and increase relaxation    Don’t use tobacco products   Limit your intake of alcohol, caffeine and salt   Monitor your blood pressure regularly: ask your provider how often   Take your medications as prescribed, even when you’re feeling well   Patient will increase lisinopril to 20 mg twice daily

## 2024-11-23 NOTE — ASSESSMENT & PLAN NOTE
Check lipid panel continue medications continue healthy eating work out  150 minutes a week patient will continue low-fat diet increase exercise to 5 times daily

## 2024-11-23 NOTE — ASSESSMENT & PLAN NOTE
Follow-up with endocrinology [facility] is using Symcircle pump   15-Julius-2019

## 2024-12-02 ENCOUNTER — TELEPHONE (OUTPATIENT)
Dept: GENETICS | Facility: CLINIC | Age: 69
End: 2024-12-02
Payer: MEDICARE

## 2024-12-02 NOTE — TELEPHONE ENCOUNTER
Ms. Price contacted the genetics  asking about next steps for her mothers genetic testing. We discussed that we will return to her personal risk assessment when we get her mothers results , as this will tell us if testing is indicated or not. Ms. Price will plan to accompany her mother during her results visit.    Ms. Price also asked where her mother could take her sample kit. I instructed her to take the kit to any  lab for the blood draw.

## 2024-12-09 DIAGNOSIS — E78.5 DYSLIPIDEMIA: ICD-10-CM

## 2024-12-09 RX ORDER — ROSUVASTATIN CALCIUM 20 MG/1
20 TABLET, COATED ORAL DAILY
Qty: 90 TABLET | Refills: 1 | Status: SHIPPED | OUTPATIENT
Start: 2024-12-09

## 2024-12-23 ENCOUNTER — TELEPHONE (OUTPATIENT)
Dept: GENETICS | Facility: CLINIC | Age: 69
End: 2024-12-23
Payer: MEDICARE

## 2024-12-23 NOTE — TELEPHONE ENCOUNTER
Summary:   Tracey Price is a 68 y.o. female with a family of breast cancer.  Tracey Price was referred to the Cancer Genetics Clinic at [facility] by Tanya Noble. Tracey Price was previously seen in genetics on 10/2/24 to discuss genetic testing. In our initial visit, we discussed that the most informative individual person for testing would be her mother, who had a personal history of breast cancer. Ms. Ferros spoke to her mother about testing, and her mother subsequently underwent comprehensive genetic testing. Her mothers testing has returned and was entirely negative. Tracey was present during the results discussion for her mother, and we discussed the implications for Tracey.      Family History: As previously Reported  A 4-generation pedigree was obtained and was significant for the following:      - Mother (85) who was diagnosed with unilateral breast cancer in her late 60's.  - Maternal uncle  in his 70's due to an unknown cancer  - Maternal grandmother with breast cancer diagnosed in her 40's. In her 50's, she was diagnosed with breast cancer in the other breast. She  in her 70's due to Alzheimer's disease.     - Father diagnosed with lymphoma in his 60's. No history of other immune or medical concerns prior to developing the cancer. Smoking history. He  at 69 due to Parkinson's disease. The cancer was reportedly gone by the time of his death.     - Paternal aunt  diagnosed with stomach cancer in her late 60's. She  in her mid 70's due to the cancer. Her son (patient's paternal cousin)  in his 40's/50's due to an unknown cancer.  - Paternal uncle  in his 30's due to throat cancer. Smoking history.  - Paternal cousin diagnosed with stomach cancer in her early 50's, and she  2 years after her diagnosis due to the cancer. In our most recent discussion, Ms. Price stated she thinks her cousin was diagnosed around 52 years old.     Maternal ancestry is  .  Paternal ancestry is . There is no known [employer] ancestry. Consanguinity was denied.     Genetic Counseling Discussion    In the absence of an identifiable genetic risk factor, Ms. Ferros cancer risks are shaped by the family history of cancer.  Breast cancer risk: Given that Ms. Burkett's mother had negative comprehensive testing, genetic testing is not indicated for Tracey based on her maternal family history. However, given the breast cancer in the family, a referral was placed for Tracey to be seen in the high risk breast clinic. Tracey was seen by Crissy Jacques on 11/19/24. Ms. Price stated that she has a breast MRI upcoming in January. We discussed that she should continue with the recommendations of her care provider based on the family history of breast cancer.  Other cancer risks:   We discussed the other cancers in the family. Per Tracey, there are no updates to the family history that was initially collected on 10/2/24 (listed above). We discussed that Tracey's paternal history is less suspicious for a hereditary predisposition to cancer. Given the paternal family history, Ms. Price does not meet national (NCCN) criteria for genetic testing.  We discussed that genetic testing can still be done on a self-pay basis (for $250). Ms. Price declined the option for self-pay testing at this time.  Ms. Price may have an increased risks for other cancers based on the family history, such as stomach cancer and lymphoma. Any cancer surveillance planning for Ms. Price's family members should involve a discussion with their physicians and an assessment of their personal risk factors. We encouraged Ms. Price to discuss her family history of cancer with her PCP.     PLAN:  Ms. Price mother had negative comprehensive genetic testing. Ms. Price declined the option for self-pay testing based on her paternal family history of cancer.   Our understanding of genetic  contribution to cancer diagnoses is always evolving, so there may be additional testing recommended in the future. Ms. Price was asked to keep us informed of any changes to their personal and/or family history of cancer, and to contact us in ~5 years to determine if there have been any changes since our discussion today.   We remain available to Ms. Price at [phone] if any questions arise regarding information discussed at today's visit.    Georgina Jewell MS, GC  Licensed Genetic Counselor  [place] [facility]  Phone: [phone]

## 2025-01-06 ENCOUNTER — HOSPITAL ENCOUNTER (OUTPATIENT)
Dept: RADIOLOGY | Facility: HOSPITAL | Age: 70
Discharge: HOME | End: 2025-01-06

## 2025-01-06 DIAGNOSIS — Z80.3 FAMILY HISTORY OF BREAST CANCER IN FIRST DEGREE RELATIVE: ICD-10-CM

## 2025-01-06 DIAGNOSIS — Z12.31 ENCOUNTER FOR SCREENING MAMMOGRAM FOR MALIGNANT NEOPLASM OF BREAST: ICD-10-CM

## 2025-01-06 DIAGNOSIS — Z12.39 BREAST CANCER SCREENING, HIGH RISK PATIENT: ICD-10-CM

## 2025-01-06 PROCEDURE — 6100000003 BI MR BREAST BILATERAL WITH CONTRAST FAST SCREENING SELF PAY

## 2025-01-06 PROCEDURE — A9575 INJ GADOTERATE MEGLUMI 0.1ML: HCPCS

## 2025-01-06 PROCEDURE — 2550000001 HC RX 255 CONTRASTS

## 2025-01-06 RX ORDER — GADOTERATE MEGLUMINE 376.9 MG/ML
16 INJECTION INTRAVENOUS
Status: COMPLETED | OUTPATIENT
Start: 2025-01-06 | End: 2025-01-06

## 2025-01-06 RX ADMIN — GADOTERATE MEGLUMINE 16 ML: 376.9 INJECTION INTRAVENOUS at 08:57

## 2025-01-07 ENCOUNTER — TELEPHONE (OUTPATIENT)
Dept: SURGERY | Facility: HOSPITAL | Age: 70
End: 2025-01-07
Payer: MEDICARE

## 2025-01-07 NOTE — TELEPHONE ENCOUNTER
Result Communication    Resulted Orders   MR breast bilateral w IV contrast fast screening self pay    Narrative    Interpreted By:  Emanuel Lowery,   STUDY:  BI MR BREAST BILATERAL WITH CONTRAST FAST SCREENING SELF PAY;  1/6/2025 9:03 am      ACCESSION NUMBER(S):  [id number]      ORDERING CLINICIAN:  IRINA VALENTE      INDICATION:  Dense breast tissue on mammography. Patient has a family history of  breast cancer. History of benign right breast biopsy.      ,Z80.3 Family history of malignant neoplasm of breast,Z12.39  Encounter for other screening for malignant neoplasm of breast,Z12.31  Encounter for screening mammogram for malignant neoplasm of breast      COMPARISON:  Mammograms dated 07/01/2024, 05/23/2023      TECHNIQUE:  Using a dedicated breast coil, STIR axial and T1-weighted fat  saturation axial images of the breasts were obtained, the latter both  before and after intravenous administration of Gadolinium DTPA.      Intravenous contrast: 16 ML of Dotarem      FINDINGS:  Density: Heterogeneous fibroglandular tissue.      There is symmetric minimal bilateral background enhancement.      RIGHT BREAST:  No suspicious mass or nonmass enhancement is  identified.      No axillary or internal mammary lymphadenopathy is appreciated.      LEFT BREAST:  No suspicious mass or nonmass enhancement is identified.      No axillary or internal mammary lymphadenopathy is appreciated.      NON-BREAST FINDINGS:  None.        Impression    No MRI evidence of malignancy in either breast.      BI-RADS CATEGORY:      BI-RADS Category:  1 Negative.  Recommendation:  Annual Screening.  Recommended Date:  1 Year.  Laterality:  Bilateral.      For any future breast imaging appointments, please call [phone] ([phone]).          MACRO:  None      Signed by: Emanuel Lowery 1/7/2025 8:40 AM  Dictation workstation:   [id number]       2:45 PM      Results were successfully communicated with the patient and they acknowledged their  understanding.

## 2025-05-23 ENCOUNTER — APPOINTMENT (OUTPATIENT)
Dept: OBSTETRICS AND GYNECOLOGY | Facility: CLINIC | Age: 70
End: 2025-05-23
Payer: COMMERCIAL

## 2025-05-27 DIAGNOSIS — E78.5 DYSLIPIDEMIA: ICD-10-CM

## 2025-05-27 RX ORDER — ROSUVASTATIN CALCIUM 20 MG/1
20 TABLET, COATED ORAL DAILY
Qty: 90 TABLET | Refills: 1 | Status: SHIPPED | OUTPATIENT
Start: 2025-05-27

## 2025-05-29 ENCOUNTER — APPOINTMENT (OUTPATIENT)
Facility: CLINIC | Age: 70
End: 2025-05-29
Payer: MEDICARE

## 2025-05-29 VITALS — WEIGHT: 176 LBS | BODY MASS INDEX: 28.28 KG/M2 | HEIGHT: 66 IN

## 2025-05-29 DIAGNOSIS — N95.8 GENITOURINARY SYNDROME OF MENOPAUSE: ICD-10-CM

## 2025-05-29 DIAGNOSIS — Z01.419 ENCOUNTER FOR GYNECOLOGICAL EXAMINATION WITHOUT ABNORMAL FINDING: Primary | ICD-10-CM

## 2025-05-29 DIAGNOSIS — R92.30 DENSE BREAST TISSUE: ICD-10-CM

## 2025-05-29 DIAGNOSIS — N76.0 VULVOVAGINITIS, ESTROGEN DEFICIENT: ICD-10-CM

## 2025-05-29 RX ORDER — ESTRADIOL 0.1 MG/G
0.5 CREAM VAGINAL NIGHTLY
Qty: 42.5 G | Refills: 3 | Status: SHIPPED | OUTPATIENT
Start: 2025-05-29 | End: 2026-05-29

## 2025-05-29 ASSESSMENT — ENCOUNTER SYMPTOMS
OCCASIONAL FEELINGS OF UNSTEADINESS: 0
DEPRESSION: 0
LOSS OF SENSATION IN FEET: 0

## 2025-05-29 ASSESSMENT — COLUMBIA-SUICIDE SEVERITY RATING SCALE - C-SSRS
6. HAVE YOU EVER DONE ANYTHING, STARTED TO DO ANYTHING, OR PREPARED TO DO ANYTHING TO END YOUR LIFE?: NO
1. IN THE PAST MONTH, HAVE YOU WISHED YOU WERE DEAD OR WISHED YOU COULD GO TO SLEEP AND NOT WAKE UP?: NO
2. HAVE YOU ACTUALLY HAD ANY THOUGHTS OF KILLING YOURSELF?: NO

## 2025-05-29 ASSESSMENT — PATIENT HEALTH QUESTIONNAIRE - PHQ9
1. LITTLE INTEREST OR PLEASURE IN DOING THINGS: NOT AT ALL
SUM OF ALL RESPONSES TO PHQ9 QUESTIONS 1 AND 2: 0
2. FEELING DOWN, DEPRESSED OR HOPELESS: NOT AT ALL

## 2025-05-29 NOTE — PROGRESS NOTES
Subjective   Tracey Price is a 69 y.o. female here for GYN care.  She has no postmenopausal bleeding or discharge.  No dysuria or change in bowel habits.  Her Cologuard in May 2023 was negative.    She is followed by the breast specialist due to family history of breast cancer in her mother and maternal grandmother.  Her mammogram in 2024 and  fast MRI in 2025 are normal.    Personal health questionnaire reviewed: yes.     Gynecologic History  No LMP recorded (lmp unknown). Patient is postmenopausal.  Contraception: post menopausal status  Last Pap: 23. Results were: normal  Last mammogram: 24. Results were: normal    Obstetric History  OB History    Para Term  AB Living   0 0 0 0 0 0   SAB IAB Ectopic Multiple Live Births   0 0 0 0 0       Objective   Constitutional: Alert and in no acute distress. Well developed, well nourished.   Head and Face: Head and face: Normal.    Eyes: Normal external exam - nonicteric sclera, extraocular movements intact (EOMI) and no ptosis.   Neck: No neck asymmetry. Supple. Thyroid not enlarged and there were no palpable thyroid nodules.    Pulmonary: No respiratory distress.   Chest: Breasts: Normal appearance, no nipple discharge and no skin changes. Palpation of breasts and axillae: No palpable mass and no axillary lymphadenopathy.   Abdomen: Soft nontender; no abdominal mass palpated. No organomegaly. No hernias.   Genitourinary: External genitalia: Normal. No inguinal lymphadenopathy. Bartholin's Urethral and Skenes Glands: Normal. Urethra: Normal.  Bladder: Normal on palpation. Vagina: Normal. Cervix: Normal.  Uterus: Normal.  Right Adnexa/parametria: Normal.  Left Adnexa/parametria: Normal.  Inspection of Perianal Area: Normal.   Musculoskeletal: No joint swelling seen, normal movements of all extremities.   Skin: Normal skin color and pigmentation, normal skin turgor, and no rash.   Neurologic: Non-focal. Grossly intact.   Psychiatric:  Alert and oriented x 3. Affect normal to patient baseline. Mood: Appropriate.  Physical Exam     Assessment/Plan   This is a 69-year-old female with a normal exam.  No Pap smear was sent, she is high risk HPV negative in 2023.   I recommend continuing the estradiol cream for the genitourinary syndrome of menopause.  She will continue using a pea-sized amount in the vagina 3 times weekly.  This was refilled to her pharmacy.  She is current on her breast imaging with the breast specialist due to family history.    We discussed follow-up in 1 year.    Education reviewed: self breast exams.

## 2025-06-26 NOTE — PROGRESS NOTES
Chief Complaint  High risk evaluation     History Of Present Illness  Tracey Price is a very pleasant 69 y.o. AA  female presenting with for a high risk breast cancer surveillance.  2017 benign right breast core biopsy yielded fibroadenoma. She denies breast surgery. Declined endocrine therapy. She has family history breast cancer in mother and maternal grandmother.      BREAST IMAGIN2025 Bilateral screening mammogram, BI-RADS Category pending. 2025 FAST breast MRI BI-RADS Category 1.       REPRODUCTIVE HISTORY: menarche age 16, nulliparous, OCP's > 20 years,  natural menopause age 50, heterogeneously dense breast tissue          FAMILY CANCER HISTORY:   Mother: Breast cancer age 65  Maternal grandmother: Breast cancer age  45    Review of Systems  Constitutional:  Negative for appetite change, fatigue, fever and unexpected weight change.   HENT:  Negative for ear pain, hearing loss, nosebleeds, sore throat and trouble swallowing.    Eyes:  Negative for discharge, itching and visual disturbance.   Breast: As stated in HPI.  Respiratory:  Negative for cough, chest tightness and shortness of breath.    Cardiovascular:  Negative for chest pain, palpitations and leg swelling.   Gastrointestinal:  Negative for abdominal pain, constipation, diarrhea and nausea.   Endocrine: Negative for cold intolerance and heat intolerance.   Genitourinary:  Negative for dysuria, frequency, hematuria, pelvic pain and vaginal bleeding.   Musculoskeletal:  Negative for arthralgias, back pain, gait problem, joint swelling and myalgias.   Skin:  Negative for color change and rash.   Allergic/Immunologic: Negative for environmental allergies and food allergies.   Neurological:  Negative for dizziness, tremors, speech difficulty, weakness, numbness and headaches.   Hematological:  Does not bruise/bleed easily.   Psychiatric/Behavioral:  Negative for agitation, dysphoric mood and sleep disturbance. The patient is not  nervous/anxious.       Surgical History  She has a past surgical history that includes Breast biopsy (Right, [id number]).     Social History  She reports that she has never smoked. She has never used smokeless tobacco. She reports that she does not currently use alcohol. She reports that she does not use drugs.    Family History  Family History   Problem Relation Name Age of Onset    Breast cancer Mother Bonnie Hernandez     Lymphoma Father Benito Hernandez     Breast cancer Maternal Grandmother Isabella Cummings         Allergies  Patient has no known allergies.    Past Medical History  She has a past medical history of Acute sinusitis (10/28/2023), Bilateral serous otitis media (10/28/2023), Foot arch pain, left (10/28/2023), Right shoulder pain (10/28/2023), Shingles (10/28/2023), Syncope and collapse (10/28/2023), Type 2 diabetes mellitus without complications (11/18/2022), Vaginal dryness (10/28/2023), and Yeast vaginitis (10/28/2023).    She has no past medical history of Personal history of irradiation.     Physical Exam  Patient is alert and oriented x3 and in a relaxed and appropriate mood. Her gait is steady and hand grasps are equal. Sclera is clear. The breasts are nearly symmetrical. The tissue is soft without palpable abnormalities, discrete nodules or masses. The skin and nipples appear normal. There is no cervical, supraclavicular or axillary lymphadenopathy. Heart rate and rhythm normal, S1 and S2 appreciated. The lungs are clear to auscultation bilaterally. Abdomen is soft and non-tender.      Last Recorded Vitals  Blood pressure 133/71, pulse 62, temperature 36.4 °C (97.6 °F), temperature source Temporal, weight 80.6 kg (177 lb 12.8 oz), SpO2 96%, not currently breastfeeding.    Relevant Results and Imaging    Interpreted By:  Emanuel Lowery,   STUDY:  BI MR BREAST BILATERAL WITH CONTRAST FAST SCREENING SELF PAY;  1/6/2025 9:03 am      ACCESSION NUMBER(S):  [id number]      ORDERING CLINICIAN:  IRINA VALENTE       INDICATION:  Dense breast tissue on mammography. Patient has a family history of  breast cancer. History of benign right breast biopsy.      ,Z80.3 Family history of malignant neoplasm of breast,Z12.39  Encounter for other screening for malignant neoplasm of breast,Z12.31  Encounter for screening mammogram for malignant neoplasm of breast      COMPARISON:  Mammograms dated 07/01/2024, 05/23/2023      TECHNIQUE:  Using a dedicated breast coil, STIR axial and T1-weighted fat  saturation axial images of the breasts were obtained, the latter both  before and after intravenous administration of Gadolinium DTPA.      Intravenous contrast: 16 ML of Dotarem      FINDINGS:  Density: Heterogeneous fibroglandular tissue.      There is symmetric minimal bilateral background enhancement.      RIGHT BREAST:  No suspicious mass or nonmass enhancement is  identified.      No axillary or internal mammary lymphadenopathy is appreciated.      LEFT BREAST:  No suspicious mass or nonmass enhancement is identified.      No axillary or internal mammary lymphadenopathy is appreciated.      NON-BREAST FINDINGS:  None.      IMPRESSION:  No MRI evidence of malignancy in either breast.      BI-RADS CATEGORY:      BI-RADS Category:  1 Negative.  Recommendation:  Annual Screening.  Recommended Date:  1 Year.  Laterality:  Bilateral.      Provider Impressions  Normal clinical breast exam and imaging, history benign breast biopsy and no surgery, family history breast cancer. Declined endocrine therapy    Risk Profile      Patient Discussion/Summary  Your clinical examination is normal. Your bilateral screening mammogram is pending. I will call you with the results if abnormal.  Please return in July 2026 for mammogram and office visit or sooner if you have any problems or concerns.     High risk breast surveillance care plan:  Yearly mammogram with digital breast tomosynthesis  Twice yearly clinical breast examinations  Breast MRI-due January  2026  Monthly self breast examinations &/or regular self breast awareness  Vitamin D3 2000 IU/daily (over the counter) unless your PCP recommends you take a specific dose  Exercise 3-4 times per week for 45-60 minutes  Limit alcohol to 3-4 drinks per week if you are menopausal  Eat healthy low-fat diet with lots of vegetable & fruits    Risk model indicates you are eligible for endocrine therapy with Tamoxifen, Raloxifene or Aromatase inhibitor. Endocrine therapy reduces lifetime risk of breast cancer by up to 50% when taken for 5 years. There is an alternative low dose Tamoxifen which can be taken for only 3 years.      IMPORTANT INFORMATION REGARDING YOUR RESULTS    If you receive medical information from My [facility] Personal Health Record (online chart) your results will be released into your chart. This means you may view or see results of your biopsy or procedure before I contact you directly. If this occurs, please call the office and we will discuss your results over the phone.     You can see your health information, review clinical summaries from office visits & test results online when you follow your health with MY  Chart, a personal health record. To sign up go to [email or web address]. If you need assistance with signing up or trouble getting into your account call [facility] Patient Line 24/7 at [phone].    My office phone number is [phone] if you need to get in touch with me or have additional questions or concerns. Thank you for choosing [facility] and trusting me as your healthcare provider. I look forward to seeing you again at your next office visit. I am honored to be a provider on your health care team and I remain dedicated to helping you achieve your health goals.    Crissy Jacques, APRN-CNP

## 2025-07-02 ENCOUNTER — OFFICE VISIT (OUTPATIENT)
Dept: SURGICAL ONCOLOGY | Facility: CLINIC | Age: 70
End: 2025-07-02
Payer: MEDICARE

## 2025-07-02 ENCOUNTER — HOSPITAL ENCOUNTER (OUTPATIENT)
Dept: RADIOLOGY | Facility: CLINIC | Age: 70
Discharge: HOME | End: 2025-07-02
Payer: MEDICARE

## 2025-07-02 VITALS
HEART RATE: 62 BPM | OXYGEN SATURATION: 96 % | SYSTOLIC BLOOD PRESSURE: 133 MMHG | TEMPERATURE: 97.6 F | WEIGHT: 177.8 LBS | DIASTOLIC BLOOD PRESSURE: 71 MMHG | BODY MASS INDEX: 28.7 KG/M2

## 2025-07-02 DIAGNOSIS — Z80.3 FAMILY HISTORY OF BREAST CANCER IN FIRST DEGREE RELATIVE: ICD-10-CM

## 2025-07-02 DIAGNOSIS — Z12.39 BREAST CANCER SCREENING, HIGH RISK PATIENT: ICD-10-CM

## 2025-07-02 DIAGNOSIS — Z12.31 ENCOUNTER FOR SCREENING MAMMOGRAM FOR MALIGNANT NEOPLASM OF BREAST: ICD-10-CM

## 2025-07-02 DIAGNOSIS — Z12.31 SCREENING MAMMOGRAM FOR BREAST CANCER: Primary | ICD-10-CM

## 2025-07-02 DIAGNOSIS — R92.333 HETEROGENEOUSLY DENSE TISSUE OF BOTH BREASTS ON MAMMOGRAPHY: ICD-10-CM

## 2025-07-02 PROCEDURE — 77063 BREAST TOMOSYNTHESIS BI: CPT | Performed by: STUDENT IN AN ORGANIZED HEALTH CARE EDUCATION/TRAINING PROGRAM

## 2025-07-02 PROCEDURE — 4010F ACE/ARB THERAPY RXD/TAKEN: CPT

## 2025-07-02 PROCEDURE — 3075F SYST BP GE 130 - 139MM HG: CPT

## 2025-07-02 PROCEDURE — 3078F DIAST BP <80 MM HG: CPT

## 2025-07-02 PROCEDURE — 1126F AMNT PAIN NOTED NONE PRSNT: CPT

## 2025-07-02 PROCEDURE — 1036F TOBACCO NON-USER: CPT

## 2025-07-02 PROCEDURE — 77067 SCR MAMMO BI INCL CAD: CPT | Performed by: STUDENT IN AN ORGANIZED HEALTH CARE EDUCATION/TRAINING PROGRAM

## 2025-07-02 PROCEDURE — 77063 BREAST TOMOSYNTHESIS BI: CPT

## 2025-07-02 PROCEDURE — 99213 OFFICE O/P EST LOW 20 MIN: CPT

## 2025-07-02 PROCEDURE — 1159F MED LIST DOCD IN RCRD: CPT

## 2025-07-02 ASSESSMENT — PAIN SCALES - GENERAL: PAINLEVEL_OUTOF10: 0-NO PAIN

## 2025-07-02 NOTE — PATIENT INSTRUCTIONS
Your clinical examination is normal. Your bilateral screening mammogram is pending. I will call you with the results if abnormal.  Please return in July 2026 for mammogram and office visit or sooner if you have any problems or concerns.     High risk breast surveillance care plan:  Yearly mammogram with digital breast tomosynthesis  Twice yearly clinical breast examinations  Breast MRI-due January 2026  Monthly self breast examinations &/or regular self breast awareness  Vitamin D3 2000 IU/daily (over the counter) unless your PCP recommends you take a specific dose  Exercise 3-4 times per week for 45-60 minutes  Limit alcohol to 3-4 drinks per week if you are menopausal  Eat healthy low-fat diet with lots of vegetable & fruits    Risk model indicates you are eligible for endocrine therapy with Tamoxifen, Raloxifene or Aromatase inhibitor. Endocrine therapy reduces lifetime risk of breast cancer by up to 50% when taken for 5 years. There is an alternative low dose Tamoxifen which can be taken for only 3 years.      IMPORTANT INFORMATION REGARDING YOUR RESULTS    If you receive medical information from My [facility] Personal Health Record (online chart) your results will be released into your chart. This means you may view or see results of your biopsy or procedure before I contact you directly. If this occurs, please call the office and we will discuss your results over the phone.     You can see your health information, review clinical summaries from office visits & test results online when you follow your health with MY  Chart, a personal health record. To sign up go to [email or web address]. If you need assistance with signing up or trouble getting into your account call [facility] Patient Line 24/7 at [phone].    My office phone number is [phone] if you need to get in touch with me or have additional questions or concerns. Thank you for choosing [facility] and trusting me as your healthcare  provider. I look forward to seeing you again at your next office visit. I am honored to be a provider on your health care team and I remain dedicated to helping you achieve your health goals.

## 2025-09-04 DIAGNOSIS — E78.5 DYSLIPIDEMIA: ICD-10-CM

## 2025-09-04 RX ORDER — ROSUVASTATIN CALCIUM 20 MG/1
20 TABLET, COATED ORAL DAILY
Qty: 90 TABLET | Refills: 1 | Status: SHIPPED | OUTPATIENT
Start: 2025-09-04

## 2025-11-04 ENCOUNTER — APPOINTMENT (OUTPATIENT)
Dept: PRIMARY CARE | Facility: CLINIC | Age: 70
End: 2025-11-04
Payer: MEDICARE

## 2026-05-29 ENCOUNTER — APPOINTMENT (OUTPATIENT)
Facility: CLINIC | Age: 71
End: 2026-05-29
Payer: MEDICARE